# Patient Record
Sex: FEMALE | URBAN - NONMETROPOLITAN AREA
[De-identification: names, ages, dates, MRNs, and addresses within clinical notes are randomized per-mention and may not be internally consistent; named-entity substitution may affect disease eponyms.]

---

## 2023-08-18 ENCOUNTER — APPOINTMENT (RX ONLY)
Dept: URBAN - NONMETROPOLITAN AREA CLINIC 39 | Facility: CLINIC | Age: 58
Setting detail: DERMATOLOGY
End: 2023-08-18

## 2023-08-18 DIAGNOSIS — L30.9 DERMATITIS, UNSPECIFIED: ICD-10-CM

## 2023-08-18 DIAGNOSIS — L0391 CELLULITIS AND ABSCESS OF UNSPECIFIED SITES: ICD-10-CM

## 2023-08-18 DIAGNOSIS — Z71.89 OTHER SPECIFIED COUNSELING: ICD-10-CM

## 2023-08-18 DIAGNOSIS — L0390 CELLULITIS AND ABSCESS OF UNSPECIFIED SITES: ICD-10-CM

## 2023-08-18 PROBLEM — L03.313 CELLULITIS OF CHEST WALL: Status: ACTIVE | Noted: 2023-08-18

## 2023-08-18 PROBLEM — N61.0 MASTITIS WITHOUT ABSCESS: Status: ACTIVE | Noted: 2023-08-18

## 2023-08-18 PROCEDURE — ? BIOPSY BY SHAVE METHOD

## 2023-08-18 PROCEDURE — 11102 TANGNTL BX SKIN SINGLE LES: CPT

## 2023-08-18 PROCEDURE — ? COUNSELING

## 2023-08-18 PROCEDURE — 99203 OFFICE O/P NEW LOW 30 MIN: CPT | Mod: 25

## 2023-08-18 PROCEDURE — ? ORDER TESTS

## 2023-08-18 PROCEDURE — ? PRESCRIPTION

## 2023-08-18 RX ORDER — MUPIROCIN 20 MG/G
OINTMENT TOPICAL
Qty: 15 | Refills: 4 | Status: ERX | COMMUNITY
Start: 2023-08-18

## 2023-08-18 RX ADMIN — MUPIROCIN: 20 OINTMENT TOPICAL at 00:00

## 2023-08-18 ASSESSMENT — LOCATION DETAILED DESCRIPTION DERM
LOCATION DETAILED: LEFT NIPPLE
LOCATION DETAILED: LEFT CLAVICULAR SKIN
LOCATION DETAILED: LEFT MEDIAL BREAST 8-9:00 REGION
LOCATION DETAILED: LEFT LATERAL SUPERIOR CHEST
LOCATION DETAILED: LEFT PERIAREOLAR BREAST 6-7:00 REGION
LOCATION DETAILED: LEFT MEDIAL BREAST 7-8:00 REGION

## 2023-08-18 ASSESSMENT — LOCATION ZONE DERM: LOCATION ZONE: TRUNK

## 2023-08-18 ASSESSMENT — LOCATION SIMPLE DESCRIPTION DERM
LOCATION SIMPLE: LEFT BREAST
LOCATION SIMPLE: LEFT CLAVICULAR SKIN
LOCATION SIMPLE: CHEST

## 2023-08-18 NOTE — PROCEDURE: ORDER TESTS
Billing Type: Third-Party Bill
Bill For Surgical Tray: no
Expected Date Of Service: 08/18/2023
Performing Laboratory: [id number]